# Patient Record
Sex: MALE | Race: WHITE | Employment: OTHER | URBAN - METROPOLITAN AREA
[De-identification: names, ages, dates, MRNs, and addresses within clinical notes are randomized per-mention and may not be internally consistent; named-entity substitution may affect disease eponyms.]

---

## 2018-08-13 ENCOUNTER — HOSPITAL ENCOUNTER (INPATIENT)
Age: 83
LOS: 1 days | Discharge: LEFT BEFORE DISCHARGE | DRG: 563 | End: 2018-08-14
Attending: EMERGENCY MEDICINE | Admitting: SURGERY
Payer: MEDICARE

## 2018-08-13 ENCOUNTER — APPOINTMENT (OUTPATIENT)
Dept: GENERAL RADIOLOGY | Age: 83
DRG: 563 | End: 2018-08-13
Payer: MEDICARE

## 2018-08-13 DIAGNOSIS — S62.101A CLOSED FRACTURE OF RIGHT WRIST, INITIAL ENCOUNTER: ICD-10-CM

## 2018-08-13 DIAGNOSIS — S12.110A CLOSED ODONTOID FRACTURE WITH TYPE II MORPHOLOGY AND ANTERIOR DISPLACEMENT, INITIAL ENCOUNTER (HCC): ICD-10-CM
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
## 2018-08-13 PROBLEM — S12.100A: Status: ACTIVE | Noted: 2018-08-13

## 2018-08-13 PROBLEM — S52.501A CLOSED FRACTURE OF DISTAL END OF RIGHT RADIUS: Status: ACTIVE | Noted: 2018-08-13

## 2018-08-13 PROBLEM — S12.111A CLOSED POSTERIOR DISPLACED TYPE II DENS FRACTURE (HCC): Status: ACTIVE | Noted: 2018-08-13

## 2018-08-13 LAB
ACETAMINOPHEN LEVEL: <5 MCG/ML (ref 10–30)
ALBUMIN SERPL-MCNC: 4.1 G/DL (ref 3.5–5.2)
ALP BLD-CCNC: 73 U/L (ref 40–129)
ALT SERPL-CCNC: 14 U/L (ref 0–40)
ANION GAP SERPL CALCULATED.3IONS-SCNC: 10 MMOL/L (ref 7–16)
AST SERPL-CCNC: 23 U/L (ref 0–39)
BASOPHILS ABSOLUTE: 0.01 E9/L (ref 0–0.2)
BASOPHILS RELATIVE PERCENT: 0.2 % (ref 0–2)
BILIRUB SERPL-MCNC: 0.7 MG/DL (ref 0–1.2)
BUN BLDV-MCNC: 15 MG/DL (ref 8–23)
CALCIUM SERPL-MCNC: 9.7 MG/DL (ref 8.6–10.2)
CHLORIDE BLD-SCNC: 92 MMOL/L (ref 98–107)
CO2: 28 MMOL/L (ref 22–29)
CREAT SERPL-MCNC: 0.8 MG/DL (ref 0.7–1.2)
EOSINOPHILS ABSOLUTE: 0.03 E9/L (ref 0.05–0.5)
EOSINOPHILS RELATIVE PERCENT: 0.5 % (ref 0–6)
ETHANOL: <10 MG/DL (ref 0–0.08)
GFR AFRICAN AMERICAN: >60
GFR NON-AFRICAN AMERICAN: >60 ML/MIN/1.73
GLUCOSE BLD-MCNC: 104 MG/DL (ref 74–109)
HCT VFR BLD CALC: 36.1 % (ref 37–54)
HEMOGLOBIN: 12.6 G/DL (ref 12.5–16.5)
IMMATURE GRANULOCYTES #: 0.04 E9/L
IMMATURE GRANULOCYTES %: 0.6 % (ref 0–5)
INR BLD: 1
LACTIC ACID: 0.8 MMOL/L (ref 0.5–2.2)
LYMPHOCYTES ABSOLUTE: 0.81 E9/L (ref 1.5–4)
LYMPHOCYTES RELATIVE PERCENT: 12.7 % (ref 20–42)
MCH RBC QN AUTO: 33.2 PG (ref 26–35)
MCHC RBC AUTO-ENTMCNC: 34.9 % (ref 32–34.5)
MCV RBC AUTO: 95.3 FL (ref 80–99.9)
MONOCYTES ABSOLUTE: 0.5 E9/L (ref 0.1–0.95)
MONOCYTES RELATIVE PERCENT: 7.9 % (ref 2–12)
NEUTROPHILS ABSOLUTE: 4.97 E9/L (ref 1.8–7.3)
NEUTROPHILS RELATIVE PERCENT: 78.1 % (ref 43–80)
PDW BLD-RTO: 13.6 FL (ref 11.5–15)
PLATELET # BLD: 207 E9/L (ref 130–450)
PMV BLD AUTO: 9.1 FL (ref 7–12)
POTASSIUM SERPL-SCNC: 4.6 MMOL/L (ref 3.5–5)
PROTHROMBIN TIME: 11.6 SEC (ref 9.3–12.4)
RBC # BLD: 3.79 E12/L (ref 3.8–5.8)
SALICYLATE, SERUM: 1 MG/DL (ref 0–30)
SODIUM BLD-SCNC: 130 MMOL/L (ref 132–146)
TOTAL PROTEIN: 6.7 G/DL (ref 6.4–8.3)
TRICYCLIC ANTIDEPRESSANTS SCREEN SERUM: NEGATIVE NG/ML
WBC # BLD: 6.4 E9/L (ref 4.5–11.5)

## 2018-08-13 PROCEDURE — 1200000000 HC SEMI PRIVATE

## 2018-08-13 PROCEDURE — 83605 ASSAY OF LACTIC ACID: CPT

## 2018-08-13 PROCEDURE — 99284 EMERGENCY DEPT VISIT MOD MDM: CPT

## 2018-08-13 PROCEDURE — G0480 DRUG TEST DEF 1-7 CLASSES: HCPCS

## 2018-08-13 PROCEDURE — 80053 COMPREHEN METABOLIC PANEL: CPT

## 2018-08-13 PROCEDURE — 80307 DRUG TEST PRSMV CHEM ANLYZR: CPT

## 2018-08-13 PROCEDURE — 99223 1ST HOSP IP/OBS HIGH 75: CPT | Performed by: SURGERY

## 2018-08-13 PROCEDURE — 73100 X-RAY EXAM OF WRIST: CPT

## 2018-08-13 PROCEDURE — 85025 COMPLETE CBC W/AUTO DIFF WBC: CPT

## 2018-08-13 PROCEDURE — 25600 CLTX DST RDL FX/EPHYS SEP WO: CPT | Performed by: ORTHOPAEDIC SURGERY

## 2018-08-13 PROCEDURE — 85610 PROTHROMBIN TIME: CPT

## 2018-08-13 PROCEDURE — 99223 1ST HOSP IP/OBS HIGH 75: CPT | Performed by: ORTHOPAEDIC SURGERY

## 2018-08-13 PROCEDURE — 36415 COLL VENOUS BLD VENIPUNCTURE: CPT

## 2018-08-13 PROCEDURE — 73130 X-RAY EXAM OF HAND: CPT

## 2018-08-13 ASSESSMENT — PAIN DESCRIPTION - FREQUENCY: FREQUENCY: CONTINUOUS

## 2018-08-13 ASSESSMENT — PAIN DESCRIPTION - DESCRIPTORS: DESCRIPTORS: CONSTANT;STABBING

## 2018-08-13 ASSESSMENT — PAIN DESCRIPTION - LOCATION: LOCATION: NECK

## 2018-08-13 ASSESSMENT — PAIN SCALES - GENERAL: PAINLEVEL_OUTOF10: 8

## 2018-08-13 ASSESSMENT — PAIN DESCRIPTION - PAIN TYPE: TYPE: ACUTE PAIN

## 2018-08-13 NOTE — ED PROVIDER NOTES
HPI:  8/13/18, Time: 7:52 PM         Franklin Solorzano is a 80 y.o. male presenting to the ED for fall. Patient had a fall over the weekend. He was evaluated in outside facility upon of a C2 fracture and a wrist fracture. He complains of pain at the wrist and the neck. He does have a c-collar on. He denies any head pain, chest pain, back pain, or any other symptoms or complaints. Review of Systems:   Pertinent positives and negatives are stated within HPI, all other systems reviewed and are negative.          --------------------------------------------- PAST HISTORY ---------------------------------------------  Past Medical History:  has no past medical history on file. Past Surgical History:  has no past surgical history on file. Social History:  reports that he quit smoking about 5 years ago. His smoking use included Cigarettes. He has never used smokeless tobacco. He reports that he does not drink alcohol or use drugs. Family History: family history is not on file. The patients home medications have been reviewed. Allergies: Patient has no known allergies. -------------------------------------------------- RESULTS -------------------------------------------------  All laboratory and radiology results have been personally reviewed by myself   LABS:  No results found for this visit on 08/13/18. RADIOLOGY:  Interpreted by Radiologist.  No orders to display       ------------------------- NURSING NOTES AND VITALS REVIEWED ---------------------------   The nursing notes within the ED encounter and vital signs as below have been reviewed.    BP (!) 152/90   Pulse 71   Temp 97.2 °F (36.2 °C)   Resp 15   Ht 6' (1.829 m)   Wt 150 lb (68 kg)   SpO2 96%   BMI 20.34 kg/m²   Oxygen Saturation Interpretation: Normal      ---------------------------------------------------PHYSICAL EXAM--------------------------------------      Constitutional/General: Alert and oriented x3, well appearing, non

## 2018-08-14 VITALS
SYSTOLIC BLOOD PRESSURE: 124 MMHG | HEART RATE: 60 BPM | BODY MASS INDEX: 20.32 KG/M2 | RESPIRATION RATE: 16 BRPM | DIASTOLIC BLOOD PRESSURE: 64 MMHG | WEIGHT: 150 LBS | OXYGEN SATURATION: 97 % | HEIGHT: 72 IN | TEMPERATURE: 98.2 F

## 2018-08-14 LAB
ALBUMIN SERPL-MCNC: 3.2 G/DL (ref 3.5–5.2)
ALP BLD-CCNC: 63 U/L (ref 40–129)
ALT SERPL-CCNC: 11 U/L (ref 0–40)
ANION GAP SERPL CALCULATED.3IONS-SCNC: 12 MMOL/L (ref 7–16)
AST SERPL-CCNC: 20 U/L (ref 0–39)
BASOPHILS ABSOLUTE: 0.02 E9/L (ref 0–0.2)
BASOPHILS RELATIVE PERCENT: 0.4 % (ref 0–2)
BILIRUB SERPL-MCNC: 0.8 MG/DL (ref 0–1.2)
BUN BLDV-MCNC: 12 MG/DL (ref 8–23)
CALCIUM SERPL-MCNC: 8.9 MG/DL (ref 8.6–10.2)
CHLORIDE BLD-SCNC: 92 MMOL/L (ref 98–107)
CO2: 26 MMOL/L (ref 22–29)
CREAT SERPL-MCNC: 0.7 MG/DL (ref 0.7–1.2)
EOSINOPHILS ABSOLUTE: 0.12 E9/L (ref 0.05–0.5)
EOSINOPHILS RELATIVE PERCENT: 2.6 % (ref 0–6)
GFR AFRICAN AMERICAN: >60
GFR NON-AFRICAN AMERICAN: >60 ML/MIN/1.73
GLUCOSE BLD-MCNC: 98 MG/DL (ref 74–109)
HCT VFR BLD CALC: 31 % (ref 37–54)
HEMOGLOBIN: 10.9 G/DL (ref 12.5–16.5)
IMMATURE GRANULOCYTES #: 0.02 E9/L
IMMATURE GRANULOCYTES %: 0.4 % (ref 0–5)
LYMPHOCYTES ABSOLUTE: 0.98 E9/L (ref 1.5–4)
LYMPHOCYTES RELATIVE PERCENT: 21.6 % (ref 20–42)
MCH RBC QN AUTO: 33.1 PG (ref 26–35)
MCHC RBC AUTO-ENTMCNC: 35.2 % (ref 32–34.5)
MCV RBC AUTO: 94.2 FL (ref 80–99.9)
MONOCYTES ABSOLUTE: 0.57 E9/L (ref 0.1–0.95)
MONOCYTES RELATIVE PERCENT: 12.6 % (ref 2–12)
NEUTROPHILS ABSOLUTE: 2.82 E9/L (ref 1.8–7.3)
NEUTROPHILS RELATIVE PERCENT: 62.4 % (ref 43–80)
PDW BLD-RTO: 13.4 FL (ref 11.5–15)
PLATELET # BLD: 188 E9/L (ref 130–450)
PMV BLD AUTO: 9.1 FL (ref 7–12)
POTASSIUM REFLEX MAGNESIUM: 4.5 MMOL/L (ref 3.5–5)
RBC # BLD: 3.29 E12/L (ref 3.8–5.8)
SODIUM BLD-SCNC: 130 MMOL/L (ref 132–146)
TOTAL PROTEIN: 5.7 G/DL (ref 6.4–8.3)
WBC # BLD: 4.5 E9/L (ref 4.5–11.5)

## 2018-08-14 PROCEDURE — 80053 COMPREHEN METABOLIC PANEL: CPT

## 2018-08-14 PROCEDURE — 85025 COMPLETE CBC W/AUTO DIFF WBC: CPT

## 2018-08-14 PROCEDURE — 99233 SBSQ HOSP IP/OBS HIGH 50: CPT | Performed by: SURGERY

## 2018-08-14 PROCEDURE — G8979 MOBILITY GOAL STATUS: HCPCS

## 2018-08-14 PROCEDURE — 97162 PT EVAL MOD COMPLEX 30 MIN: CPT

## 2018-08-14 PROCEDURE — 97530 THERAPEUTIC ACTIVITIES: CPT

## 2018-08-14 PROCEDURE — 36415 COLL VENOUS BLD VENIPUNCTURE: CPT

## 2018-08-14 PROCEDURE — G8987 SELF CARE CURRENT STATUS: HCPCS

## 2018-08-14 PROCEDURE — G8978 MOBILITY CURRENT STATUS: HCPCS

## 2018-08-14 PROCEDURE — 6370000000 HC RX 637 (ALT 250 FOR IP): Performed by: STUDENT IN AN ORGANIZED HEALTH CARE EDUCATION/TRAINING PROGRAM

## 2018-08-14 PROCEDURE — G8988 SELF CARE GOAL STATUS: HCPCS

## 2018-08-14 PROCEDURE — 99024 POSTOP FOLLOW-UP VISIT: CPT | Performed by: ORTHOPAEDIC SURGERY

## 2018-08-14 PROCEDURE — 97166 OT EVAL MOD COMPLEX 45 MIN: CPT

## 2018-08-14 RX ORDER — DOCUSATE SODIUM 100 MG/1
100 CAPSULE, LIQUID FILLED ORAL 2 TIMES DAILY
Status: DISCONTINUED | OUTPATIENT
Start: 2018-08-14 | End: 2018-08-14 | Stop reason: HOSPADM

## 2018-08-14 RX ORDER — SODIUM CHLORIDE 0.9 % (FLUSH) 0.9 %
10 SYRINGE (ML) INJECTION EVERY 12 HOURS SCHEDULED
Status: DISCONTINUED | OUTPATIENT
Start: 2018-08-14 | End: 2018-08-14 | Stop reason: HOSPADM

## 2018-08-14 RX ORDER — HYDROCODONE BITARTRATE AND ACETAMINOPHEN 5; 325 MG/1; MG/1
1 TABLET ORAL EVERY 4 HOURS PRN
Status: DISCONTINUED | OUTPATIENT
Start: 2018-08-14 | End: 2018-08-14 | Stop reason: HOSPADM

## 2018-08-14 RX ORDER — ACETAMINOPHEN 325 MG/1
650 TABLET ORAL EVERY 4 HOURS PRN
Status: DISCONTINUED | OUTPATIENT
Start: 2018-08-14 | End: 2018-08-14 | Stop reason: HOSPADM

## 2018-08-14 RX ORDER — ONDANSETRON 2 MG/ML
4 INJECTION INTRAMUSCULAR; INTRAVENOUS EVERY 6 HOURS PRN
Status: DISCONTINUED | OUTPATIENT
Start: 2018-08-14 | End: 2018-08-14 | Stop reason: HOSPADM

## 2018-08-14 RX ORDER — BISACODYL 10 MG
10 SUPPOSITORY, RECTAL RECTAL DAILY PRN
Status: DISCONTINUED | OUTPATIENT
Start: 2018-08-14 | End: 2018-08-14 | Stop reason: HOSPADM

## 2018-08-14 RX ORDER — SODIUM CHLORIDE 0.9 % (FLUSH) 0.9 %
10 SYRINGE (ML) INJECTION PRN
Status: DISCONTINUED | OUTPATIENT
Start: 2018-08-14 | End: 2018-08-14 | Stop reason: HOSPADM

## 2018-08-14 RX ORDER — HYDROCODONE BITARTRATE AND ACETAMINOPHEN 5; 325 MG/1; MG/1
2 TABLET ORAL EVERY 4 HOURS PRN
Status: DISCONTINUED | OUTPATIENT
Start: 2018-08-14 | End: 2018-08-14 | Stop reason: HOSPADM

## 2018-08-14 RX ADMIN — HYDROCODONE BITARTRATE AND ACETAMINOPHEN 2 TABLET: 5; 325 TABLET ORAL at 10:30

## 2018-08-14 RX ADMIN — DOCUSATE SODIUM 100 MG: 100 CAPSULE, LIQUID FILLED ORAL at 10:30

## 2018-08-14 RX ADMIN — DOCUSATE SODIUM 100 MG: 100 CAPSULE, LIQUID FILLED ORAL at 00:51

## 2018-08-14 RX ADMIN — HYDROCODONE BITARTRATE AND ACETAMINOPHEN 2 TABLET: 5; 325 TABLET ORAL at 00:51

## 2018-08-14 ASSESSMENT — PAIN SCALES - GENERAL
PAINLEVEL_OUTOF10: 8
PAINLEVEL_OUTOF10: 9
PAINLEVEL_OUTOF10: 8
PAINLEVEL_OUTOF10: 0
PAINLEVEL_OUTOF10: 8

## 2018-08-14 ASSESSMENT — PAIN DESCRIPTION - DESCRIPTORS: DESCRIPTORS: ACHING;CONSTANT;STABBING

## 2018-08-14 ASSESSMENT — PAIN DESCRIPTION - PAIN TYPE: TYPE: ACUTE PAIN

## 2018-08-14 ASSESSMENT — PAIN DESCRIPTION - LOCATION: LOCATION: NECK;ARM

## 2018-08-14 NOTE — CONSULTS
alcohol. DRUGS:   reports that he does not use drugs. Family History:   History reviewed. No pertinent family history. REVIEW OF SYSTEMS:  CONSTITUTIONAL:  negative for  fevers, chills  EYES:  negative for blurred vision, visual disturbance  HEENT:  negative for  hearing loss, voice change  RESPIRATORY:  negative for  dyspnea, wheezing  CARDIOVASCULAR:  negative for  chest pain, palpitations  GASTROINTESTINAL:  negative for nausea, vomiting  GENITOURINARY:  negative for frequency, urinary incontinence  HEMATOLOGIC/LYMPHATIC:  negative for bleeding and petechiae  MUSCULOSKELETAL:  positive for  pain  NEUROLOGICAL:  negative for headaches, dizziness  BEHAVIOR/PSYCH:  negative for increased agitation and anxiety      PHYSICAL EXAM:    VITALS:  /63   Pulse 65   Temp 98 °F (36.7 °C) (Temporal)   Resp 16   Ht 6' (1.829 m)   Wt 150 lb (68 kg)   SpO2 97%   BMI 20.34 kg/m²   CONSTITUTIONAL:  awake, alert, cooperative, no apparent distress, and appears stated age  MUSCULOSKELETAL:  Primary Exam:  · R UE  · Sugar tong splint intact  · + Swelling and ecchymosis right dorsum hand and fingers  · Cap refill to digits < 3 sec  · No pain with Shoulder/elbow ROM  · + AROM of digits  · Sensation intact to touch in digits  · Compartments soft    Secondary Exam:   ·  No abnormal swelling/pain to L UE or B LE.      DATA:    CBC:   Lab Results   Component Value Date    WBC 4.5 08/14/2018    RBC 3.29 08/14/2018    HGB 10.9 08/14/2018    HCT 31.0 08/14/2018    MCV 94.2 08/14/2018    MCH 33.1 08/14/2018    MCHC 35.2 08/14/2018    RDW 13.4 08/14/2018     08/14/2018    MPV 9.1 08/14/2018     PT/INR:    Lab Results   Component Value Date    PROTIME 11.6 08/13/2018    INR 1.0 08/13/2018       Radiology  Xray right hand/wrist:   Extra-articular, impacted distal radius fx with ~ 50% dorsal displacement   Minimally displaced ulnar styloid fx   SLAC wrist deformity with DISI deformity, unlikely acute perilunate iunjury       IMPRESSION:  · Closed right distal radius fx  · Advanced degenerative changes right wirst  · SLAC wrist right wrist  · Type 2 Dens fx    PLAN:  · Continue Sugar tong splint  · Await Neurosurgery input  · Discussed possible ORIF of Distal radius fx.   At this point, patient wants to follow up in [place] with his Orthopedist  · Follow up with Dr. Zhang Jackson for right wrist if does not follow up with orthopedist in [place]  · D/W Dr. Zhang Jackson

## 2018-08-14 NOTE — H&P
normal     Right normal  Loss of consciousness: No    History Obtained From:  patient  Private Medical Doctor: none    Pre-exisiting Medical History:  yes    Conditions: knee arthritis    Medications:   norco    Allergies: No Known Allergies    Social History:   Social History   Substance Use Topics    Smoking status: Former Smoker     Types: Cigarettes     Quit date: 8/13/2013    Smokeless tobacco: Never Used    Alcohol use No       Past Surgical History:  Ex lap for blockage as a child, and hernia repair    NSAID use in last 72 hours: no  Taken PCN in past:  unknown  Last food/drink: breakfast  Last tetanus: unknown    Complaints:   Neck pain, right wrist pain    SECONDARY SURVEY  Head/scalp: No soft tissue injuries    Face: No soft tissue injuries    Eyes/ears/nose: EOMI, PEERL, no soft tissue injuries    Pharynx/mouth:  No soft tissue injury, no malocclusion    Neck: No soft tissue injuries   Cervical spine tenderness: mild  ROM:  Cervical collar in place    Chest wall:  CTAB, no crepitus appreciated, no soft tissue injuries    Heart:  RRR    Abdomen: Soft, non-distended, no soft tissue injuries   Tenderness:  none    Pelvis: Stable, no soft tissue injuries, no pain with hip flexion   Tenderness: none    Thoracolumbar spine: No soft tissue injuries, no step-offs  Tenderness:  none    Genitourinary:  no traumatic injuries    Rectum: no traumatic injuries    Perineum: no traumatic injuries    Extremities: right forearm splinted; right hand cool and swollen and bruised but with bounding radial pulse  Sensory normal  Motor normal except for splinted right forearm    Distal Pulses  Left arm Normal  Right arm Normal  Left leg Normal  Right leg Normal    Capillary refill   Left arm normal  Right arm normal  Left leg normal   Right leg normal      Procedures in ED:  None    Lacerations sutured by: none  Description of repair: none    Radiology:   CT head from OSH without intracranial bleed  CT cervical spine with type II dens fracture with 5mm posterior displacement    Consultations:  Neurosurgery, likely Orthopedics    Admission/Diagnosis:   80 y.o. male s/p fall down 4 steps 3 days ago with type II dens fracture and right wrist fracture    Code Status: Limited: ok for short term intubation but no compressions, resuscitative medications, or defibrillation    Plan of Treatment:  Admit to general floor  Neurosurgery consult, continue Aspen collar  Xray right wrist, likely will consult Orthopedics  Pain control  PTOT    Plan discussed with Dr. Arazna Ho.     Tiffanie Ardon on 8/13/2018 at 9:01 PM

## 2018-08-14 NOTE — CARE COORDINATION
Social Work/Discharge Planning:    SW met with pt, pt stated that she lives alone in a 1 story home. PTA pt stated that he is independent with no DME. Pt denies any hx with SNF/HHC. Pt stated that he does not have a PCP at this time. Pt stated that plan at discharge is to return home. Pt stated the his sister and friends can assist if needed. Pt stated that his sister lives across the street. SW discussed transportation with pt, Pt stated that he is not sure of his transportation home at this time, pt stated that he will call his sister. SW discussed [address] with pt, pt declined [address] at this time. SW discussed shower chair with pt, pt declined shower chair at this time. SW attempted to call pt sister Noa Orosco ([phone]) to discuss d/c planning and transportation for pt, No answer at this time and SW was unable to leave message. ANDREW will continue to attempt to contact pt sister. ANDREW will follow.     Electronically signed by HOME Hogan on 8/14/2018 at 12:04 PM

## 2018-08-14 NOTE — PROGRESS NOTES
Trauma Tertiary Survey    Admit Date: 8/13/2018  Hospital day 1    Fall down 3 steps    CC:  R wrist pain, neck pain      History reviewed. No pertinent past medical history. Alcohol pre-screening:  Men: How many times in the past year have you had 5 or more drinks in a day?  none      Scheduled Meds:   sodium chloride flush  10 mL Intravenous 2 times per day    docusate sodium  100 mg Oral BID     Continuous Infusions:  PRN Meds:sodium chloride flush, acetaminophen, magnesium hydroxide, bisacodyl, ondansetron, HYDROcodone 5 mg - acetaminophen **OR** HYDROcodone 5 mg - acetaminophen    Subjective:     Pt says he is doing ok and doesn't want to stay in the hospital for much longer. Denies nausea, vomiting. Objective:   Patient Vitals for the past 8 hrs:   BP Temp Temp src Pulse Resp SpO2   08/14/18 0427 135/63 98 °F (36.7 °C) Temporal 65 16 -   08/14/18 0030 (!) 164/76 98.4 °F (36.9 °C) Oral 66 16 97 %   08/13/18 2347 132/65 97.2 °F (36.2 °C) Oral 62 16 98 %   08/13/18 2208 (!) 160/68 - - 64 16 99 %       No intake/output data recorded. No intake/output data recorded. History reviewed. No pertinent past medical history. Radiology:  XR WRIST RIGHT (2 VIEWS)   Final Result   1. Still present an impacted fracture of the distal radius as seen by   the lateral projections. 2. Some relative improvement in the position of the lunate since the   previous study. XR HAND RIGHT (MIN 3 VIEWS)   Final Result   1. Acute impacted fracture of the distal right radius as discussed on   the x-ray series of the right wrist      2. Avulsion fracture of the ulnar styloid process      3. Lunate dislocation with the volar position of the lunate in   relation to the long axis of the capitate bone, there is a dorsal   rotation of the distal articular surface of the lunate. 4. Widening of the scaphoid lunate joint implies seen injury of the   scapholunate ligament.       ALERT:  THIS IS AN ABNORMAL REPORT radius    Closed dens fracture, initial encounter ([facility] [address]    Closed posterior displaced Type II dens fracture ([facility] [address]  Resolved Problems:    * No resolved hospital problems. *        Assessment/Plan:       Neuro:    Fall down 5 steps  Type 2 dens fracture, neurosurgery consulted  Pain control   No gross neuro deficits     CV: Monitor hemodynamics. Meds as from home. Pulm:   No acute issues    Monitor RR & SpO2. Encourage cough, SMI & deep breathing. GI: No acute issues. Diet:  general  Monitor bowel function. Zofran. Renal: No acute issues. Monitor BUN & Cr.   Monitor electrolytes & replace as needed. Monitor urine output. ID: No acute issues     Endocrine: No acute issues. MSK: R wrist fx, ortho following, recommending outpt sx, pt electing sx closer to home, type 2 dens fracture, neurosurgery consulted, recommendations appreciated, collar in place  PT/OT when able   · Heme: No acute issues. Monitor CBC. Bowel regime: Colace. .  MOM. Ducolax suppository. Pain control/Sedation:   Norco, tylenol    DVT prophylaxis: SCDs. Lovenox. GI: Diet  Glucose protocol:  ISS  Mouth/Eye care: per pt   Code status:   Full Code    Patient/Family update:  As available     Disposition:  Neurosurg, dispo planning       Electronically signed by [facility] HospitalDO on 8/14/18 at 5:39 AM  Attending Physician Statement:  I have examined the patient, reviewed the record, and discussed the case with the surgical team.  I agree with the assessment and plan with the following additions, corrections, and changes. No neuro deficits. Strongly wants to go home. We'll check with other services, possible discharge. Fernando Cason DO, FACS    NOTE: This report was transcribed using voice recognition software. Every effort was made to ensure accuracy; however, inadvertent computerized transcription errors may be present.

## 2018-08-14 NOTE — PROGRESS NOTES
Pt verbalized understanding Pt demonstrated skill Pt requires further education in this area   Yes At times Yes     Comments:  Pt received supine and agreeable to PT evaluation with OT collaboration with encouragement. Pt educated on NWB RUE precautions and cervical precautions prior to mobility. Pt c/o neck pain without radiation during movement. Pt required consistent VC's to maintain NWB of RUE during bed mobility and STS. Pt denies dizziness or light headedness upon standing. Pt able to arian socks prior to ambulation. Pt ambulated in cerrato with no LOB and slow gait speed. Pt performed full flight of stairs with 1 rail and no LOB. Pt required VC's for safety during ambulation and stair training. Pt left sitting up in chair with RUE elevated on pillow, call button in reach, and needs met. Pts/ family goals   1. Go home     Patient and or family understand(s) diagnosis, prognosis, and plan of care. Yes     PLAN  PT care will be provided in accordance with the objectives noted above. Whenever appropriate, clear delegation orders will be provided for nursing staff. Exercises and functional mobility practice will be used as well as appropriate assistive devices or modalities to obtain goals. Patient and family education will also be administered as needed. Frequency of treatments: daily.     Time in  0750  Time out  [address] [place] [address], PT, DPTRACE  [id number]

## 2018-08-14 NOTE — CONSULTS
Department of Orthopedic Surgery  Consult Note    CHIEF COMPLAINT:  Right wrist fx    HISTORY OF PRESENT ILLNESS:                The patient is a 80 y.o. male with hx of injury to right wrist on Friday 8/10/18. He was seen at outside facility and placed in a volar wrist splint. He was given follow up instructions for his orthopedist in [facility]. He is currently planning on seeing his orthopedist in [place] for the wrist fx. He denies previous fractures to the right hand/wrist   He currently is a transfer from outside facility due to a type 2 dens fx sustained on Friday night. He is currently in a hard collar. He admits to swelling in the wrist and hand. He denies any numbness or tingling in the hand. He denies any right shoulder or elbow pain. Denies any injury to L UE or B LE    Past Medical History:    History reviewed. No pertinent past medical history. Past Surgical History:    History reviewed. No pertinent surgical history. Current Medications:   No current facility-administered medications for this encounter. Allergies:  Patient has no known allergies. Social History:   TOBACCO:   reports that he quit smoking about 5 years ago. His smoking use included Cigarettes. He has never used smokeless tobacco.  ETOH:   reports that he does not drink alcohol. DRUGS:   reports that he does not use drugs. Family History:   History reviewed. No pertinent family history.     REVIEW OF SYSTEMS:  CONSTITUTIONAL:  negative for  fevers, chills  EYES:  negative for blurred vision, visual disturbance  HEENT:  negative for  hearing loss, voice change  RESPIRATORY:  negative for  dyspnea, wheezing  CARDIOVASCULAR:  negative for  chest pain, palpitations  GASTROINTESTINAL:  negative for nausea, vomiting  GENITOURINARY:  negative for frequency, urinary incontinence  HEMATOLOGIC/LYMPHATIC:  negative for bleeding and petechiae  MUSCULOSKELETAL:  positive for  pain  NEUROLOGICAL:  negative for headaches, dizziness  BEHAVIOR/PSYCH:  negative for increased agitation and anxiety      PHYSICAL EXAM:    VITALS:  BP (!) 152/90   Pulse 71   Temp 97.2 °F (36.2 °C)   Resp 15   Ht 6' (1.829 m)   Wt 150 lb (68 kg)   SpO2 96%   BMI 20.34 kg/m²   CONSTITUTIONAL:  awake, alert, cooperative, no apparent distress, and appears stated age    HEENT: Head is normocephalic, atraumatic. PERRLA.      SKIN: Clean, dry, intact. There is not any cellulitis or cutaneous lesions noted in the lower extremities     PULMONARY: breathing is regular and unlabored, no acute distress     CV: The bilateral lower extremities are warm and well-perfused with brisk capillary refill. 2+ pulses LE bilateral.      PSYCHIATRY: Pleasant mood, appropriate behavior, follows commands     NEURO: Sensation is intact distally with light touch with no alteration. Motor exam of the lower extremities show quadriceps, hamstrings, foot dorsiflexion and plantarflexion grossly intact 5/5. MUSCULOSKELETAL:  Primary Exam:  · R UE  · Skin intact  · Volar splint intact  · + Swelling and ecchymosis right dorsum hand and wrist  · Mod tenderness over distal radius fx  · No tenderness over Carpal bones  · Limited wrist ROM  · Cap refill to digits < 3 sec  · 2+ Radial  · No pain with Shoulder/elbow ROM  · Sensation intact to touch in digits  · Compartments soft    Post reduction R UE:   Skin intact   Compartments soft   Sensation intact to touch in digits   Cap refill < 2 sec    Secondary Exam:   ·  No abnormal swelling/pain to L UE or B LE.      DATA:    CBC:   Lab Results   Component Value Date    WBC 6.4 08/13/2018    RBC 3.79 08/13/2018    HGB 12.6 08/13/2018    HCT 36.1 08/13/2018    MCV 95.3 08/13/2018    MCH 33.2 08/13/2018    MCHC 34.9 08/13/2018    RDW 13.6 08/13/2018     08/13/2018    MPV 9.1 08/13/2018     PT/INR:    Lab Results   Component Value Date    PROTIME 11.6 08/13/2018    INR 1.0 08/13/2018       Radiology  Xray right hand/wrist:   Extra-articular, impacted distal radius fx with ~ 50% dorsal displacement   Minimally displaced ulnar styloid fx   Scapholunate widening with degenerative changes to wirst   DIS deformity of carpals versus perilunate dislocation     Post reduction:    Able to reduce carpals under fluoro   No Improvement to distal radius alignment        IMPRESSION:  · Closed right distal radius fx  · Perilunate dislocation versus SLAC wrist  · Type 2 Dens fx    PLAN:  · Sugar tong splint  · Await Neurosurgery input

## 2018-08-14 NOTE — PROGRESS NOTES
Neurosurgery  Patient seen and examined  Full note to be dictated  Has C2 fracture  Check repeat cervical ct  Collar for 8 weeks

## 2018-08-14 NOTE — PROGRESS NOTES
Department of Orthopedic Trauma Surgery  Office Note    The patient is a 80 y.o. male who was seen at an outside facility on Friday 8/10 for a right wrist injury, and neck injury. He has no new complaints today. He is stating he would like to go home and does not want surgery for his wrist. He was told that surgery for the wrist is the recommendation. He wants to follow-up outpatient with an orthopedic surgeon closer to home. VITALS:  /63   Pulse 65   Temp 98 °F (36.7 °C) (Temporal)   Resp 16   Ht 6' (1.829 m)   Wt 150 lb (68 kg)   SpO2 97%   BMI 20.34 kg/m²     REVIEW OF SYSTEMS:   Skin: no abnormal pigmentation, rash  Eyes: no blurring or eye pain   Ears/Nose/Throat: no hearing loss, tinnitus  Respiratory: No increased work of breathing, no coughing  Cardiovascular: Brisk capillary refill bilaterally, well perfused extremities  Gastrointestinal: no nausea, vomiting  Neurologic: no paralysis, or seizures    MUSCULOSKELETAL:   right upper extremity:  · Swelling to the right wrist.   · TTP over the right wrist  · Echymosis to the right hand  · Sugar tong splint in place  · +AIN/PIN/Ulnar nerve function intact grossly  · + Radial pulse, Brisk capillary refill  · Sensation intact to touch in radial/ulnar/median nerve distributions to hand      ASSESSMENT  · Lunate dislocation  · Distal extra articular radius fracture    PLAN    Well padded sugar tong splint  Ice  Pain control  NWB RUE  Possible surgery once cleared for C-Spine pending patient decision, possible seen as outpatient.    Case discussed with Dr. Robyn Rowley